# Patient Record
Sex: MALE | Race: WHITE | NOT HISPANIC OR LATINO | Employment: FULL TIME | URBAN - METROPOLITAN AREA
[De-identification: names, ages, dates, MRNs, and addresses within clinical notes are randomized per-mention and may not be internally consistent; named-entity substitution may affect disease eponyms.]

---

## 2020-09-08 ENCOUNTER — OFFICE VISIT (OUTPATIENT)
Dept: URGENT CARE | Facility: CLINIC | Age: 25
End: 2020-09-08
Payer: COMMERCIAL

## 2020-09-08 VITALS
SYSTOLIC BLOOD PRESSURE: 126 MMHG | HEART RATE: 78 BPM | TEMPERATURE: 98.4 F | DIASTOLIC BLOOD PRESSURE: 65 MMHG | BODY MASS INDEX: 19.75 KG/M2 | HEIGHT: 73 IN | OXYGEN SATURATION: 99 % | WEIGHT: 149 LBS

## 2020-09-08 DIAGNOSIS — R11.0 NAUSEA: Primary | ICD-10-CM

## 2020-09-08 DIAGNOSIS — R42 DIZZY: ICD-10-CM

## 2020-09-08 PROCEDURE — G0382 LEV 3 HOSP TYPE B ED VISIT: HCPCS | Performed by: PHYSICIAN ASSISTANT

## 2020-09-08 NOTE — LETTER
September 8, 2020     Patient: Derick Baires   YOB: 1995   Date of Visit: 9/8/2020       To Whom it May Concern:    Derick Baires is under my professional care  He was seen in my office on 9/8/2020  He may return to work on 09/09/2020  If you have any questions or concerns, please don't hesitate to call           Sincerely,          Kendell Eduardo PA-C        CC: No Recipients